# Patient Record
Sex: MALE | URBAN - METROPOLITAN AREA
[De-identification: names, ages, dates, MRNs, and addresses within clinical notes are randomized per-mention and may not be internally consistent; named-entity substitution may affect disease eponyms.]

---

## 2022-02-04 ENCOUNTER — LAB OUTSIDE AN ENCOUNTER (OUTPATIENT)
Dept: URBAN - METROPOLITAN AREA CLINIC 23 | Facility: CLINIC | Age: 77
End: 2022-02-04

## 2022-02-04 ENCOUNTER — OFFICE VISIT (OUTPATIENT)
Dept: URBAN - METROPOLITAN AREA CLINIC 23 | Facility: CLINIC | Age: 77
End: 2022-02-04
Payer: MEDICARE

## 2022-02-04 ENCOUNTER — DASHBOARD ENCOUNTERS (OUTPATIENT)
Age: 77
End: 2022-02-04

## 2022-02-04 DIAGNOSIS — K62.5 RECTAL BLEEDING: ICD-10-CM

## 2022-02-04 PROCEDURE — 99205 OFFICE O/P NEW HI 60 MIN: CPT | Performed by: INTERNAL MEDICINE

## 2022-02-04 NOTE — HPI-TODAY'S VISIT:
- 77 yo  male from the [place], referred by Dr. Yony Higuera for further evaluation of rectal bleeding.  A copy of this note will be sent to the referring physician. - Intermittent rectal bleeding for the past 2 months.  Notes spotting of bright red blood in the toilet paper but has also noted red blood staining the toilet water red.  Occurs about 2-3 times per week. - Patient has never had a colonoscopy in the past - Denies diarrhea, constipation, change in bowel habits or weight loss.  States he has bowel movements once a day, composed of solid stools. - Denies family history of GI malignancies in any first degree relatives - Denies heartburn symptoms or history of chronic GERD - States he had recent bloodwork performed at Dr. Gomez office

## 2022-02-07 ENCOUNTER — TELEPHONE ENCOUNTER (OUTPATIENT)
Dept: URBAN - METROPOLITAN AREA CLINIC 92 | Facility: CLINIC | Age: 77
End: 2022-02-07

## 2022-02-07 ENCOUNTER — OFFICE VISIT (OUTPATIENT)
Dept: URBAN - METROPOLITAN AREA LAB 3 | Facility: LAB | Age: 77
End: 2022-02-07
Payer: MEDICARE

## 2022-02-07 DIAGNOSIS — K62.5 ANAL BLEEDING: ICD-10-CM

## 2022-02-07 DIAGNOSIS — D12.5 ADENOMA OF SIGMOID COLON: ICD-10-CM

## 2022-02-07 DIAGNOSIS — D12.0 ADENOMA OF CECUM: ICD-10-CM

## 2022-02-07 DIAGNOSIS — D12.3 ADENOMA OF TRANSVERSE COLON: ICD-10-CM

## 2022-02-07 PROCEDURE — 45381 COLONOSCOPY SUBMUCOUS NJX: CPT | Performed by: INTERNAL MEDICINE

## 2022-02-07 PROCEDURE — 45380 COLONOSCOPY AND BIOPSY: CPT | Performed by: INTERNAL MEDICINE

## 2022-02-07 PROCEDURE — 45385 COLONOSCOPY W/LESION REMOVAL: CPT | Performed by: INTERNAL MEDICINE

## 2022-02-08 ENCOUNTER — TELEPHONE ENCOUNTER (OUTPATIENT)
Dept: URBAN - METROPOLITAN AREA CLINIC 23 | Facility: CLINIC | Age: 77
End: 2022-02-08

## 2022-02-08 ENCOUNTER — LAB OUTSIDE AN ENCOUNTER (OUTPATIENT)
Dept: URBAN - METROPOLITAN AREA CLINIC 23 | Facility: CLINIC | Age: 77
End: 2022-02-08

## 2022-02-08 PROBLEM — 168501001 RADIOLOGY RESULT ABNORMAL: Status: ACTIVE | Noted: 2022-02-08

## 2022-02-08 PROBLEM — 427359005 SOLITARY NODULE OF LUNG: Status: ACTIVE | Noted: 2022-02-08

## 2022-02-08 PROBLEM — 363406005 MALIGNANT TUMOR OF COLON: Status: ACTIVE | Noted: 2022-02-07

## 2022-02-08 LAB
ABSOLUTE BASOPHIL COUNT: 0.05
ABSOLUTE EOSINOPHIL COUNT: 0.42
ABSOLUTE IMMATURE GRANULOCYTE  COUNT: 0.04
ABSOLUTE LYMPHOCYTE COUNT: 2.7
ABSOLUTE MONOCYTE COUNT: 0.93
ABSOLUTE NEUTROPHIL COUNT (ANC): 6.29
ABSOLUTE NRBC  COUNT: 0
AG RATIO: 1
ALBUMIN LEVEL: 3.9
ALK PHOS: 88
ALT: 15
ANION GAP: 10
AST: 17
BASOPHIL AUTO: 0
BILIRUBIN TOTAL: 0.9
BUN/CREAT RATIO: 14
BUN: 17
CALCIUM LEVEL: 9
CEA: 2.5
CHLORIDE LEVEL: 104
CO2 LEVEL: 25
CREATININE LEVEL: 1.2
CREATININE POC: 1.2
EOS AUTO: 4
GFR AFRICAN AMERICAN: >60
GFR NON AFRICAN AMERICAN: 59
GLUCOSE LEVEL: 73
HCT: 43.5
HGB: 14.4
IMMATURE GRANULOCYTES AUTO: 0.4
INR: 1.1
LYMPH AUTO: 26
MCH: 29.6
MCHC: 33.1
MCV: 89.5
MONO AUTO: 9
MPV: 10.3
NEUTRO AUTO: 60
NRBC AUTO: 0
OSMO (CALC): 278
PERFORMING LAB: (no result)
PLATELETS: 194
POTASSIUM LEVEL: 4
PROTEIN TOTAL: 7.3
PT: 13.4
RBC: 4.86
RDW: 14.2
SODIUM LEVEL: 139
WBC: 10.4

## 2022-02-10 ENCOUNTER — TELEPHONE ENCOUNTER (OUTPATIENT)
Dept: URBAN - METROPOLITAN AREA CLINIC 77 | Facility: CLINIC | Age: 77
End: 2022-02-10

## 2022-02-16 ENCOUNTER — LAB OUTSIDE AN ENCOUNTER (OUTPATIENT)
Dept: URBAN - METROPOLITAN AREA CLINIC 23 | Facility: CLINIC | Age: 77
End: 2022-02-16

## 2022-02-16 ENCOUNTER — TELEPHONE ENCOUNTER (OUTPATIENT)
Dept: URBAN - METROPOLITAN AREA CLINIC 92 | Facility: CLINIC | Age: 77
End: 2022-02-16

## 2022-02-16 LAB
GLUCOSE POC: 81
PERFORMING LAB: (no result)

## 2022-02-17 ENCOUNTER — OFFICE VISIT (OUTPATIENT)
Dept: URBAN - METROPOLITAN AREA CLINIC 23 | Facility: CLINIC | Age: 77
End: 2022-02-17

## 2024-05-21 ENCOUNTER — OFFICE VISIT (OUTPATIENT)
Dept: URBAN - METROPOLITAN AREA CLINIC 23 | Facility: CLINIC | Age: 79
End: 2024-05-21

## 2024-06-06 ENCOUNTER — OFFICE VISIT (OUTPATIENT)
Dept: URBAN - METROPOLITAN AREA CLINIC 23 | Facility: CLINIC | Age: 79
End: 2024-06-06
Payer: MEDICARE

## 2024-06-06 ENCOUNTER — LAB OUTSIDE AN ENCOUNTER (OUTPATIENT)
Dept: URBAN - METROPOLITAN AREA CLINIC 23 | Facility: CLINIC | Age: 79
End: 2024-06-06

## 2024-06-06 VITALS
HEART RATE: 76 BPM | SYSTOLIC BLOOD PRESSURE: 134 MMHG | BODY MASS INDEX: 26.21 KG/M2 | HEIGHT: 67 IN | WEIGHT: 167 LBS | TEMPERATURE: 97.7 F | DIASTOLIC BLOOD PRESSURE: 70 MMHG

## 2024-06-06 DIAGNOSIS — E66.3 OVERWEIGHT (BMI 25.0-29.9): ICD-10-CM

## 2024-06-06 DIAGNOSIS — R91.8 LUNG NODULES: ICD-10-CM

## 2024-06-06 DIAGNOSIS — K57.30 DIVERTICULOSIS OF COLON: ICD-10-CM

## 2024-06-06 DIAGNOSIS — Z86.010 HISTORY OF ADENOMATOUS POLYP OF COLON: ICD-10-CM

## 2024-06-06 PROCEDURE — 99214 OFFICE O/P EST MOD 30 MIN: CPT | Performed by: INTERNAL MEDICINE

## 2024-06-06 NOTE — HPI-TODAY'S VISIT:
- 77 yo  male from the [place] who returns for follow-up; I last saw him in February 2022   - Patient has a history of large colonic adenomas, both right-sided and left-sided, for which he underwent laparoscopic right hemicolectomy and left hemicolectomy with Dr. Navarro in 2022.  There was no evidence of malignancy.  Patient is due for a surveillance colonoscopy.   - Patient also has a history of left-sided colonic diverticulosis without prior episodes of diverticulitis   - Patient also has a history of small lung nodules but he has not yet seen a Pulmonologist.   - I have discussed with the patient the findings of previous endoscopic procedures, pathology results, pertinent labs, and pertinent radiology results.  All questions were answered to their satisfaction.

## 2024-06-14 ENCOUNTER — OFFICE VISIT (OUTPATIENT)
Dept: URBAN - METROPOLITAN AREA SURGERY CENTER 15 | Facility: SURGERY CENTER | Age: 79
End: 2024-06-14

## 2024-07-05 ENCOUNTER — CLAIMS CREATED FROM THE CLAIM WINDOW (OUTPATIENT)
Dept: URBAN - METROPOLITAN AREA SURGERY CENTER 15 | Facility: SURGERY CENTER | Age: 79
End: 2024-07-05
Payer: MEDICARE

## 2024-07-05 ENCOUNTER — CLAIMS CREATED FROM THE CLAIM WINDOW (OUTPATIENT)
Dept: URBAN - METROPOLITAN AREA CLINIC 4 | Facility: CLINIC | Age: 79
End: 2024-07-05
Payer: MEDICARE

## 2024-07-05 DIAGNOSIS — K63.5 BENIGN COLON POLYP: ICD-10-CM

## 2024-07-05 DIAGNOSIS — Z98.0 H/O BILLROTH II OPERATION: ICD-10-CM

## 2024-07-05 DIAGNOSIS — K63.5 POLYP OF COLON: ICD-10-CM

## 2024-07-05 DIAGNOSIS — Z86.010 ADENOMAS PERSONAL HISTORY OF COLONIC POLYPS: ICD-10-CM

## 2024-07-05 DIAGNOSIS — D12.2 BENIGN NEOPLASM OF ASCENDING COLON: ICD-10-CM

## 2024-07-05 DIAGNOSIS — D12.2 ADENOMA OF ASCENDING COLON: ICD-10-CM

## 2024-07-05 DIAGNOSIS — D12.3 BENIGN NEOPLASM OF TRANSVERSE COLON: ICD-10-CM

## 2024-07-05 DIAGNOSIS — K62.1 ANAL AND RECTAL POLYP: ICD-10-CM

## 2024-07-05 DIAGNOSIS — K63.5 HYPERPLASTIC COLON POLYPS: ICD-10-CM

## 2024-07-05 DIAGNOSIS — Z09 ENCNTR FOR F/U EXAM AFT TRTMT FOR COND OTH THAN MALIG NEOPLM: ICD-10-CM

## 2024-07-05 DIAGNOSIS — Z86.010 PERSONAL HISTORY OF COLONIC POLYPS: ICD-10-CM

## 2024-07-05 DIAGNOSIS — D12.3 ADENOMA OF TRANSVERSE COLON: ICD-10-CM

## 2024-07-05 PROCEDURE — 45380 COLONOSCOPY AND BIOPSY: CPT | Performed by: INTERNAL MEDICINE

## 2024-07-05 PROCEDURE — 00811 ANES LWR INTST NDSC NOS: CPT | Performed by: REGISTERED NURSE

## 2024-07-05 PROCEDURE — 45385 COLONOSCOPY W/LESION REMOVAL: CPT | Performed by: INTERNAL MEDICINE

## 2024-07-05 PROCEDURE — 88305 TISSUE EXAM BY PATHOLOGIST: CPT | Performed by: PATHOLOGY
